# Patient Record
Sex: FEMALE | Race: WHITE | NOT HISPANIC OR LATINO | URBAN - METROPOLITAN AREA
[De-identification: names, ages, dates, MRNs, and addresses within clinical notes are randomized per-mention and may not be internally consistent; named-entity substitution may affect disease eponyms.]

---

## 2020-06-24 ENCOUNTER — OFFICE VISIT (OUTPATIENT)
Dept: URBAN - METROPOLITAN AREA CLINIC 113 | Facility: CLINIC | Age: 20
End: 2020-06-24

## 2020-07-02 ENCOUNTER — OFFICE VISIT (OUTPATIENT)
Dept: URBAN - METROPOLITAN AREA CLINIC 113 | Facility: CLINIC | Age: 20
End: 2020-07-02

## 2020-07-25 ENCOUNTER — TELEPHONE ENCOUNTER (OUTPATIENT)
Dept: URBAN - METROPOLITAN AREA CLINIC 13 | Facility: CLINIC | Age: 20
End: 2020-07-25

## 2020-07-25 RX ORDER — CIPROFLOXACIN HYDROCHLORIDE 500 MG/1
TABLET, FILM COATED ORAL
Qty: 10 | Refills: 0 | OUTPATIENT
Start: 2019-09-09 | End: 2020-04-27

## 2020-07-25 RX ORDER — ADALIMUMAB 40MG/0.4ML
INJECT 40 MG SUBCUTANEOUSLY EVERY OTHER WEEK AS DIRECTED KIT SUBCUTANEOUS
Qty: 1 | Refills: 6 | OUTPATIENT
Start: 2020-01-02 | End: 2020-05-27

## 2020-07-26 ENCOUNTER — TELEPHONE ENCOUNTER (OUTPATIENT)
Dept: URBAN - METROPOLITAN AREA CLINIC 13 | Facility: CLINIC | Age: 20
End: 2020-07-26

## 2020-07-26 RX ORDER — ADALIMUMAB 40MG/0.4ML
INJECT 40 MG SC EVERY 2 WEEKS KIT SUBCUTANEOUS
Qty: 6 | Refills: 3 | Status: ACTIVE | COMMUNITY
Start: 2020-04-27

## 2020-07-26 RX ORDER — FLUCONAZOLE 150 MG/1
TABLET ORAL
Qty: 1 | Refills: 0 | Status: ACTIVE | COMMUNITY
Start: 2019-07-12

## 2020-07-26 RX ORDER — DEXAMETHASONE 4 MG/1
TABLET ORAL
Qty: 8 | Refills: 0 | Status: ACTIVE | COMMUNITY
Start: 2020-02-12

## 2020-07-26 RX ORDER — CIPROFLOXACIN HYDROCHLORIDE 500 MG/1
TABLET, FILM COATED ORAL
Qty: 10 | Refills: 0 | Status: ACTIVE | COMMUNITY
Start: 2019-09-09

## 2020-07-26 RX ORDER — OXYCODONE AND ACETAMINOPHEN 5; 325 MG/1; MG/1
TABLET ORAL
Qty: 20 | Refills: 0 | Status: ACTIVE | COMMUNITY
Start: 2019-09-09

## 2020-07-26 RX ORDER — AMOXICILLIN AND CLAVULANATE POTASSIUM 500; 125 MG/1; MG/1
TABLET, FILM COATED ORAL
Qty: 9 | Refills: 0 | Status: ACTIVE | COMMUNITY
Start: 2019-07-26

## 2020-07-26 RX ORDER — CHLORHEXIDINE GLUCONATE 0.12% ORAL RINSE 1.2 MG/ML
SOLUTION ORAL
Qty: 473 | Refills: 0 | Status: ACTIVE | COMMUNITY
Start: 2020-02-12

## 2020-07-26 RX ORDER — ADALIMUMAB 40MG/0.8ML
KIT SUBCUTANEOUS
Qty: 4 | Refills: 0 | Status: ACTIVE | COMMUNITY
Start: 2020-01-02

## 2020-07-26 RX ORDER — CEPHALEXIN 500 MG/1
CAPSULE ORAL
Qty: 42 | Refills: 0 | Status: ACTIVE | COMMUNITY
Start: 2020-03-04

## 2020-07-26 RX ORDER — AMOXICILLIN 500 MG/1
CAPSULE ORAL
Qty: 14 | Refills: 0 | Status: ACTIVE | COMMUNITY
Start: 2019-08-27

## 2020-07-26 RX ORDER — ADALIMUMAB 40MG/0.4ML
KIT SUBCUTANEOUS
Qty: 4 | Refills: 0 | Status: ACTIVE | COMMUNITY
Start: 2019-06-19

## 2020-07-26 RX ORDER — PANTOPRAZOLE SODIUM 40 MG
TAKE 1 TABLET TWICE DAILY TABLET, DELAYED RELEASE (ENTERIC COATED) ORAL
Qty: 180 | Refills: 3 | Status: ACTIVE | COMMUNITY
Start: 2019-12-31

## 2020-07-26 RX ORDER — IBUPROFEN 800 MG/1
TABLET ORAL
Qty: 20 | Refills: 0 | Status: ACTIVE | COMMUNITY
Start: 2020-02-12

## 2020-07-26 RX ORDER — HYDROCODONE BITARTRATE AND ACETAMINOPHEN 10; 325 MG/1; MG/1
TABLET ORAL
Qty: 20 | Refills: 0 | Status: ACTIVE | COMMUNITY
Start: 2020-02-12

## 2020-07-26 RX ORDER — AMOXICILLIN 500 MG/1
CAPSULE ORAL
Qty: 20 | Refills: 0 | Status: ACTIVE | COMMUNITY
Start: 2020-02-12

## 2020-07-26 RX ORDER — ADALIMUMAB 40MG/0.8ML
KIT SUBCUTANEOUS
Qty: 12 | Refills: 0 | Status: ACTIVE | COMMUNITY
Start: 2020-05-27

## 2020-07-26 RX ORDER — PROMETHAZINE HYDROCHLORIDE 25 MG/1
TABLET ORAL
Qty: 6 | Refills: 0 | Status: ACTIVE | COMMUNITY
Start: 2020-02-12

## 2020-07-26 RX ORDER — PANTOPRAZOLE SODIUM 40 MG/1
TAKE 1 TABLET DAILY TABLET, DELAYED RELEASE ORAL
Qty: 30 | Refills: 3 | Status: ACTIVE | COMMUNITY
Start: 2020-07-02

## 2020-07-26 RX ORDER — ADALIMUMAB 80MG/0.8ML
KIT SUBCUTANEOUS
Qty: 3 | Refills: 0 | Status: ACTIVE | COMMUNITY
Start: 2019-06-19

## 2020-07-26 RX ORDER — FLUCONAZOLE 150 MG/1
TABLET ORAL
Qty: 1 | Refills: 0 | Status: ACTIVE | COMMUNITY
Start: 2019-07-26

## 2020-07-26 RX ORDER — AMITRIPTYLINE HYDROCHLORIDE 25 MG/1
TABLET, FILM COATED ORAL
Qty: 28 | Refills: 0 | Status: ACTIVE | COMMUNITY
Start: 2019-07-26

## 2020-07-26 RX ORDER — METRONIDAZOLE 500 MG/1
TABLET ORAL
Qty: 14 | Refills: 0 | Status: ACTIVE | COMMUNITY
Start: 2019-07-26

## 2020-07-26 RX ORDER — NITROFURANTOIN MONOHYDRATE/MACROCRYSTALLINE 25; 75 MG/1; MG/1
CAPSULE ORAL
Qty: 14 | Refills: 0 | Status: ACTIVE | COMMUNITY
Start: 2019-05-08

## 2020-07-26 RX ORDER — FLUCONAZOLE 150 MG/1
TABLET ORAL
Qty: 1 | Refills: 0 | Status: ACTIVE | COMMUNITY
Start: 2019-07-10

## 2020-08-24 ENCOUNTER — TELEPHONE ENCOUNTER (OUTPATIENT)
Dept: URBAN - METROPOLITAN AREA CLINIC 113 | Facility: CLINIC | Age: 20
End: 2020-08-24

## 2020-08-24 RX ORDER — ADALIMUMAB 40MG/0.4ML
AS DIRECTED KIT SUBCUTANEOUS EVERY 2 WEEKS
Qty: 20 | Refills: 0

## 2020-08-25 ENCOUNTER — TELEPHONE ENCOUNTER (OUTPATIENT)
Dept: URBAN - METROPOLITAN AREA CLINIC 113 | Facility: CLINIC | Age: 20
End: 2020-08-25

## 2020-08-25 RX ORDER — ADALIMUMAB 40MG/0.8ML
0.8 ML KIT SUBCUTANEOUS
Qty: 2 PRE-FILLED PEN SYRINGE | Refills: 0
Start: 2020-05-27 | End: 2020-09-01

## 2020-12-28 ENCOUNTER — OFFICE VISIT (OUTPATIENT)
Dept: URBAN - METROPOLITAN AREA CLINIC 113 | Facility: CLINIC | Age: 20
End: 2020-12-28

## 2021-01-20 ENCOUNTER — OFFICE VISIT (OUTPATIENT)
Dept: URBAN - METROPOLITAN AREA CLINIC 113 | Facility: CLINIC | Age: 21
End: 2021-01-20
Payer: OTHER GOVERNMENT

## 2021-01-20 ENCOUNTER — DASHBOARD ENCOUNTERS (OUTPATIENT)
Age: 21
End: 2021-01-20

## 2021-01-20 VITALS
DIASTOLIC BLOOD PRESSURE: 90 MMHG | SYSTOLIC BLOOD PRESSURE: 134 MMHG | BODY MASS INDEX: 36.32 KG/M2 | HEIGHT: 66 IN | HEART RATE: 91 BPM | WEIGHT: 226 LBS | TEMPERATURE: 98.7 F

## 2021-01-20 DIAGNOSIS — K50.111 CROHN'S DISEASE OF COLON WITH RECTAL BLEEDING: ICD-10-CM

## 2021-01-20 PROBLEM — 50440006: Status: ACTIVE | Noted: 2021-01-20

## 2021-01-20 PROCEDURE — 99213 OFFICE O/P EST LOW 20 MIN: CPT | Performed by: INTERNAL MEDICINE

## 2021-01-20 RX ORDER — OXYCODONE AND ACETAMINOPHEN 5; 325 MG/1; MG/1
TABLET ORAL
Qty: 20 | Refills: 0 | Status: ON HOLD | COMMUNITY
Start: 2019-09-09

## 2021-01-20 RX ORDER — NITROFURANTOIN MONOHYDRATE/MACROCRYSTALLINE 25; 75 MG/1; MG/1
CAPSULE ORAL
Qty: 14 | Refills: 0 | Status: ON HOLD | COMMUNITY
Start: 2019-05-08

## 2021-01-20 RX ORDER — PANTOPRAZOLE SODIUM 40 MG
TAKE 1 TABLET TWICE DAILY TABLET, DELAYED RELEASE (ENTERIC COATED) ORAL
Qty: 180 | Refills: 3 | Status: ON HOLD | COMMUNITY
Start: 2019-12-31

## 2021-01-20 RX ORDER — ADALIMUMAB 40MG/0.8ML
KIT SUBCUTANEOUS
Qty: 4 | Refills: 0 | Status: ACTIVE | COMMUNITY
Start: 2020-01-02

## 2021-01-20 RX ORDER — AMOXICILLIN AND CLAVULANATE POTASSIUM 500; 125 MG/1; MG/1
TABLET, FILM COATED ORAL
Qty: 9 | Refills: 0 | Status: ON HOLD | COMMUNITY
Start: 2019-07-26

## 2021-01-20 RX ORDER — DEXAMETHASONE 4 MG/1
TABLET ORAL
Qty: 8 | Refills: 0 | Status: ON HOLD | COMMUNITY
Start: 2020-02-12

## 2021-01-20 RX ORDER — ADALIMUMAB 40MG/0.4ML
1 SUBQ  Q 2 WEEKS KIT SUBCUTANEOUS
Qty: 40 | Refills: 2 | OUTPATIENT
Start: 2021-01-20 | End: 2021-10-17

## 2021-01-20 RX ORDER — PANTOPRAZOLE SODIUM 40 MG/1
TAKE 1 TABLET DAILY TABLET, DELAYED RELEASE ORAL
Qty: 30 | Refills: 3 | Status: ON HOLD | COMMUNITY
Start: 2020-07-02

## 2021-01-20 RX ORDER — SERTRALINE HYDROCHLORIDE 25 MG/1
1 TABLET TABLET, FILM COATED ORAL ONCE A DAY
Status: ACTIVE | COMMUNITY

## 2021-01-20 RX ORDER — ADALIMUMAB 80MG/0.8ML
KIT SUBCUTANEOUS
Qty: 3 | Refills: 0 | Status: ACTIVE | COMMUNITY
Start: 2019-06-19

## 2021-01-20 RX ORDER — CHLORHEXIDINE GLUCONATE 0.12% ORAL RINSE 1.2 MG/ML
SOLUTION ORAL
Qty: 473 | Refills: 0 | Status: ON HOLD | COMMUNITY
Start: 2020-02-12

## 2021-01-20 RX ORDER — CEPHALEXIN 500 MG/1
CAPSULE ORAL
Qty: 42 | Refills: 0 | Status: ON HOLD | COMMUNITY
Start: 2020-03-04

## 2021-01-20 RX ORDER — CIPROFLOXACIN HYDROCHLORIDE 500 MG/1
TABLET, FILM COATED ORAL
Qty: 10 | Refills: 0 | Status: ON HOLD | COMMUNITY
Start: 2019-09-09

## 2021-01-20 RX ORDER — AMOXICILLIN 500 MG/1
CAPSULE ORAL
Qty: 14 | Refills: 0 | Status: ON HOLD | COMMUNITY
Start: 2019-08-27

## 2021-01-20 RX ORDER — PROMETHAZINE HYDROCHLORIDE 25 MG/1
TABLET ORAL
Qty: 6 | Refills: 0 | Status: ON HOLD | COMMUNITY
Start: 2020-02-12

## 2021-01-20 RX ORDER — IBUPROFEN 800 MG/1
TABLET ORAL
Qty: 20 | Refills: 0 | Status: ON HOLD | COMMUNITY
Start: 2020-02-12

## 2021-01-20 RX ORDER — FLUCONAZOLE 150 MG/1
TABLET ORAL
Qty: 1 | Refills: 0 | Status: ON HOLD | COMMUNITY
Start: 2019-07-10

## 2021-01-20 RX ORDER — SODIUM, POTASSIUM,MAG SULFATES 17.5-3.13G
354 ML SOLUTION, RECONSTITUTED, ORAL ORAL ONCE
Qty: 354 ML | Refills: 0 | OUTPATIENT
Start: 2021-01-20

## 2021-01-20 RX ORDER — HYDROCODONE BITARTRATE AND ACETAMINOPHEN 10; 325 MG/1; MG/1
TABLET ORAL
Qty: 20 | Refills: 0 | Status: ON HOLD | COMMUNITY
Start: 2020-02-12

## 2021-01-20 RX ORDER — METRONIDAZOLE 500 MG/1
TABLET ORAL
Qty: 14 | Refills: 0 | Status: ON HOLD | COMMUNITY
Start: 2019-07-26

## 2021-01-20 RX ORDER — ADALIMUMAB 40MG/0.4ML
AS DIRECTED KIT SUBCUTANEOUS EVERY 2 WEEKS
Qty: 20 | Refills: 0 | Status: ACTIVE | COMMUNITY

## 2021-01-20 RX ORDER — AMITRIPTYLINE HYDROCHLORIDE 25 MG/1
TABLET, FILM COATED ORAL
Qty: 28 | Refills: 0 | Status: ON HOLD | COMMUNITY
Start: 2019-07-26

## 2021-01-20 NOTE — HPI-TODAY'S VISIT:
21 y/o female with a hx of Crohn's Disease. She is presenting for f/u. She was last seen in the clinic July 22, 2020. She has since delivered her baby boy and he is healthy/3 months old. She has been on Humira every 2 weeks and is having some rectal bleeding once weekly. Otherwise she feels well.    7/2/2020 This is a 20-year-old female with a history of anemia, migraines, GERD, and ileocecal Crohn's disease diagnosed in 2016 on Humira since February 2019 presenting for follow-up. She was last seen 4/27/20. At that time, she was 12 weeks pregnant. She was doing well on Humira. She was instructed to continue therapy. She is currently 22 weeks pregnant. She is compliant with Humira 40 mg subcutaneous every 2 weeks. She has experienced some constipation associated with pregnancy which has resolved since beginning daily fiber. She is having daily episodes of heartburn. She took pantoprazole in the past which was ineffective. She has occasional early morning nausea that has been present since she became pregnant. She denies vomiting. She denies red blood per rectum or melena. She denies any other abdominal symptoms. She has not had recent labs. 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 She is unsure whether or not she has obtained pneumococcal vaccination in the past. Anesthesia Volume In Cc: 9

## 2021-02-02 ENCOUNTER — TELEPHONE ENCOUNTER (OUTPATIENT)
Dept: URBAN - METROPOLITAN AREA CLINIC 113 | Facility: CLINIC | Age: 21
End: 2021-02-02

## 2021-02-02 RX ORDER — POLYETHYLENE GLYCOL 3350, SODIUM CHLORIDE, SODIUM BICARBONATE AND POTASSIUM CHLORIDE 420G
AS DIRECTED KIT ORAL ONCE
Qty: 420 GRAM | Refills: 0 | OUTPATIENT
Start: 2021-02-02 | End: 2021-02-03

## 2021-02-09 ENCOUNTER — TELEPHONE ENCOUNTER (OUTPATIENT)
Dept: URBAN - METROPOLITAN AREA CLINIC 113 | Facility: CLINIC | Age: 21
End: 2021-02-09

## 2021-02-09 RX ORDER — ADALIMUMAB 40MG/0.8ML
KIT SUBCUTANEOUS
Qty: 4 | Refills: 0 | Status: ACTIVE | COMMUNITY
Start: 2020-01-02

## 2021-02-09 RX ORDER — NITROFURANTOIN MONOHYDRATE/MACROCRYSTALLINE 25; 75 MG/1; MG/1
CAPSULE ORAL
Qty: 14 | Refills: 0 | Status: ON HOLD | COMMUNITY
Start: 2019-05-08

## 2021-02-09 RX ORDER — FLUCONAZOLE 150 MG/1
TABLET ORAL
Qty: 1 | Refills: 0 | Status: ON HOLD | COMMUNITY
Start: 2019-07-10

## 2021-02-09 RX ORDER — SODIUM, POTASSIUM,MAG SULFATES 17.5-3.13G
354 ML SOLUTION, RECONSTITUTED, ORAL ORAL ONCE
Qty: 354 ML | Refills: 0 | Status: ACTIVE | COMMUNITY
Start: 2021-01-20

## 2021-02-09 RX ORDER — METRONIDAZOLE 500 MG/1
TABLET ORAL
Qty: 14 | Refills: 0 | Status: ON HOLD | COMMUNITY
Start: 2019-07-26

## 2021-02-09 RX ORDER — ADALIMUMAB 80MG/0.8ML
KIT SUBCUTANEOUS
Qty: 3 | Refills: 0 | Status: ACTIVE | COMMUNITY
Start: 2019-06-19

## 2021-02-09 RX ORDER — ADALIMUMAB 40MG/0.4ML
AS DIRECTED KIT SUBCUTANEOUS EVERY 2 WEEKS
Qty: 20 | Refills: 0 | Status: ACTIVE | COMMUNITY

## 2021-02-09 RX ORDER — HYDROCODONE BITARTRATE AND ACETAMINOPHEN 10; 325 MG/1; MG/1
TABLET ORAL
Qty: 20 | Refills: 0 | Status: ON HOLD | COMMUNITY
Start: 2020-02-12

## 2021-02-09 RX ORDER — AMOXICILLIN 500 MG/1
CAPSULE ORAL
Qty: 14 | Refills: 0 | Status: ON HOLD | COMMUNITY
Start: 2019-08-27

## 2021-02-09 RX ORDER — PROMETHAZINE HYDROCHLORIDE 25 MG/1
TABLET ORAL
Qty: 6 | Refills: 0 | Status: ON HOLD | COMMUNITY
Start: 2020-02-12

## 2021-02-09 RX ORDER — DEXAMETHASONE 4 MG/1
TABLET ORAL
Qty: 8 | Refills: 0 | Status: ON HOLD | COMMUNITY
Start: 2020-02-12

## 2021-02-09 RX ORDER — CEPHALEXIN 500 MG/1
CAPSULE ORAL
Qty: 42 | Refills: 0 | Status: ON HOLD | COMMUNITY
Start: 2020-03-04

## 2021-02-09 RX ORDER — ADALIMUMAB 40MG/0.4ML
1 SUBQ  Q 2 WEEKS KIT SUBCUTANEOUS
Qty: 40 | Refills: 2 | Status: ACTIVE | COMMUNITY
Start: 2021-01-20 | End: 2021-10-17

## 2021-02-09 RX ORDER — POLYETHYLENE GLYCOL 3350, SODIUM SULFATE, SODIUM CHLORIDE, POTASSIUM CHLORIDE, ASCORBIC ACID, SODIUM ASCORBATE 140-9-5.2G
AS DIRECTED KIT ORAL
Qty: 1 | Refills: 0 | OUTPATIENT
Start: 2021-02-09 | End: 2021-02-10

## 2021-02-09 RX ORDER — CHLORHEXIDINE GLUCONATE 0.12% ORAL RINSE 1.2 MG/ML
SOLUTION ORAL
Qty: 473 | Refills: 0 | Status: ON HOLD | COMMUNITY
Start: 2020-02-12

## 2021-02-09 RX ORDER — CIPROFLOXACIN HYDROCHLORIDE 500 MG/1
TABLET, FILM COATED ORAL
Qty: 10 | Refills: 0 | Status: ON HOLD | COMMUNITY
Start: 2019-09-09

## 2021-02-09 RX ORDER — IBUPROFEN 800 MG/1
TABLET ORAL
Qty: 20 | Refills: 0 | Status: ON HOLD | COMMUNITY
Start: 2020-02-12

## 2021-02-09 RX ORDER — AMOXICILLIN AND CLAVULANATE POTASSIUM 500; 125 MG/1; MG/1
TABLET, FILM COATED ORAL
Qty: 9 | Refills: 0 | Status: ON HOLD | COMMUNITY
Start: 2019-07-26

## 2021-02-09 RX ORDER — OXYCODONE AND ACETAMINOPHEN 5; 325 MG/1; MG/1
TABLET ORAL
Qty: 20 | Refills: 0 | Status: ON HOLD | COMMUNITY
Start: 2019-09-09

## 2021-02-09 RX ORDER — PANTOPRAZOLE SODIUM 40 MG/1
TAKE 1 TABLET DAILY TABLET, DELAYED RELEASE ORAL
Qty: 30 | Refills: 3 | Status: ON HOLD | COMMUNITY
Start: 2020-07-02

## 2021-02-09 RX ORDER — SERTRALINE HYDROCHLORIDE 25 MG/1
1 TABLET TABLET, FILM COATED ORAL ONCE A DAY
Status: ACTIVE | COMMUNITY

## 2021-02-09 RX ORDER — AMITRIPTYLINE HYDROCHLORIDE 25 MG/1
TABLET, FILM COATED ORAL
Qty: 28 | Refills: 0 | Status: ON HOLD | COMMUNITY
Start: 2019-07-26

## 2021-02-09 RX ORDER — PANTOPRAZOLE SODIUM 40 MG
TAKE 1 TABLET TWICE DAILY TABLET, DELAYED RELEASE (ENTERIC COATED) ORAL
Qty: 180 | Refills: 3 | Status: ON HOLD | COMMUNITY
Start: 2019-12-31

## 2021-02-12 ENCOUNTER — OFFICE VISIT (OUTPATIENT)
Dept: URBAN - METROPOLITAN AREA SURGERY CENTER 25 | Facility: SURGERY CENTER | Age: 21
End: 2021-02-12

## 2021-04-21 ENCOUNTER — OFFICE VISIT (OUTPATIENT)
Dept: URBAN - METROPOLITAN AREA CLINIC 113 | Facility: CLINIC | Age: 21
End: 2021-04-21

## 2021-05-26 ENCOUNTER — TELEPHONE ENCOUNTER (OUTPATIENT)
Dept: URBAN - METROPOLITAN AREA CLINIC 113 | Facility: CLINIC | Age: 21
End: 2021-05-26

## 2021-05-26 RX ORDER — ADALIMUMAB 40MG/0.8ML
KIT SUBCUTANEOUS
Qty: 4 | Refills: 0
Start: 2020-01-02